# Patient Record
Sex: FEMALE | Race: BLACK OR AFRICAN AMERICAN
[De-identification: names, ages, dates, MRNs, and addresses within clinical notes are randomized per-mention and may not be internally consistent; named-entity substitution may affect disease eponyms.]

---

## 2019-03-27 ENCOUNTER — HOSPITAL ENCOUNTER (OUTPATIENT)
Dept: HOSPITAL 61 - MAMMO | Age: 49
Discharge: HOME | End: 2019-03-27
Attending: FAMILY MEDICINE
Payer: COMMERCIAL

## 2019-03-27 ENCOUNTER — HOSPITAL ENCOUNTER (OUTPATIENT)
Dept: HOSPITAL 61 - US | Age: 49
Discharge: HOME | End: 2019-03-27
Attending: FAMILY MEDICINE
Payer: COMMERCIAL

## 2019-03-27 DIAGNOSIS — Z12.31: Primary | ICD-10-CM

## 2019-03-27 DIAGNOSIS — N63.10: ICD-10-CM

## 2019-03-27 DIAGNOSIS — N60.02: Primary | ICD-10-CM

## 2019-03-27 DIAGNOSIS — N60.01: ICD-10-CM

## 2019-03-27 DIAGNOSIS — N63.20: ICD-10-CM

## 2019-03-27 PROCEDURE — 76641 ULTRASOUND BREAST COMPLETE: CPT

## 2019-03-27 PROCEDURE — 77067 SCR MAMMO BI INCL CAD: CPT

## 2019-03-27 PROCEDURE — 77063 BREAST TOMOSYNTHESIS BI: CPT

## 2019-03-27 NOTE — RAD
Bilateral breast ultrasound, 3/27/2019:



History: Abnormal mammogram



A targeted ultrasound exam of both breasts was performed in the areas where

nodules were identified on the recent mammograms.



On the right, at the 9:00 location approximately 2 cm from the nipple there is

a 1.2 cm simple cyst.  This corresponds to the nodule seen on the mammograms.



On the left at the 3:00 location approximately 1 cm from the nipple there is a

3.7 cm simple cyst.  Just posterior to this cyst there is a second smaller 1.8

cm cyst.  These cysts correspond in location to the nodule seen on the

mammograms.





IMPRESSION: Bilateral simple cysts.  Routine yearly mammographic follow-up is

suggested.





BI-RADS 3-benign findings

## 2019-03-27 NOTE — RAD
DATE: 3/27/2019



EXAM: MAMMO SAMPSON SCREENING BILATERAL



HISTORY: Routine screening



COMPARISON: 7/5/2012



This study was interpreted with the benefit of Computerized Aided Detection

(CAD).



Breast Density: HETERO The breast parenchyma is heterogenously dense, which

could reduce sensitivity of mammography. Breast parenchyma level C.



FINDINGS: 2-D and 3-D tomosynthesis imaging was performed in CC and MLO

projections.  On the right there is a 10 mm smooth superficial periareolar

nodule just lateral to the midline as best seen on CC tomosynthesis image #31.

There is a larger 3.5 cm smooth retroareolar mass on the left centered just

lateral to the midline.  There is an additional 17 mm smooth nodule located

just posterior to this larger nodule in the left breast.  No spiculated mass

or architectural distortion is seen.  No suspicious microcalcifications are

evident.



IMPRESSION: Bilateral smooth breast nodules are probably cysts.  Sonographic

evaluation is suggested for confirmation.





BI-RADS CATEGORY: 0 INCOMPLETE: NEEDS ADDITIONAL IMAGING EVALUATION AND/OR

PRIOR MAMMOGRAMS FOR COMPARISON.



RECOMMENDED FOLLOW-UP: ADD ADDITIONAL IMAGING



PQRS compliance statement: Patient information was entered into a reminder

system with a target due date     for the next mammogram.



Mammography is a sensitive method for finding small breast cancers, but it

does not detect them all and is not a substitute for careful clinical

examination.  A negative mammogram does not negate a clinically suspicious

finding and should not result in delay in biopsying a clinically suspicious

abnormality.



"Our facility is accredited by the [employer] Mammography

Program."

## 2021-04-09 ENCOUNTER — HOSPITAL ENCOUNTER (OUTPATIENT)
Dept: HOSPITAL 61 - KCIC MAMMO | Age: 51
End: 2021-04-09
Attending: FAMILY MEDICINE
Payer: COMMERCIAL

## 2021-04-09 DIAGNOSIS — Z12.31: Primary | ICD-10-CM

## 2021-04-09 DIAGNOSIS — N64.89: ICD-10-CM

## 2021-04-09 PROCEDURE — 77063 BREAST TOMOSYNTHESIS BI: CPT

## 2021-04-09 PROCEDURE — 77067 SCR MAMMO BI INCL CAD: CPT

## 2021-04-09 NOTE — [facility]
Bilateral digital screening mammograms with 3-D tomosynthesis:



Reason for examination: Routine screening.



Comparison is made to previous studies dated 3/27/2019 and 6/14/2012.



Bilateral mammograms in CC and oblique projections were obtained with 2-D imaging and 3-D tomosynthes
is imaging on a Siemens Inspiration unit and reviewed on the workstation. Interpretation was made wit
h the benefit of CAD.



The skin and nipples show no abnormalities. No abnormal axillary lymph nodes are seen. The breast par
enchyma is extremely dense. (Breast density: Category D.) There continue to be circumscribed lesions 
in the anterior lateral left breast corresponding to cysts seen on previous ultrasound examinations. 
There are no suspicious calcifications evident.



Impression:



No evidence of malignancy. Recommend routine screening.



Your patient's mammogram demonstrates that she has dense breast tissue (breast density category C or 
D), which could hide abnormalities, and if she has other risk factors for breast cancer that have bee
n identified, she might benefit from supplemental screening tests that may be suggested by you as her
 ordering physician. Dense breast tissue, in and of itself, is a relatively common condition. Therefo
re, this information is not provided to cause undue concern, but rather to raise your awareness and t
o promote discussion with your patient regarding the presence of other risk factors, in addition to d
ense breast tissue. Your patient's mammography results will be sent to her.



BI-RAD Category 2: Benign.



"Our facility is accredited by the [employer] Mammography Program."



This patient's information has been entered into a reminder system for the patient to be notified wit
h the results of her examination and a target date for the next mammogram.



Electronically signed by: Kelly Gamble MD (4/9/2021 4:06 PM) [facility]